# Patient Record
Sex: FEMALE | Race: ASIAN | NOT HISPANIC OR LATINO
[De-identification: names, ages, dates, MRNs, and addresses within clinical notes are randomized per-mention and may not be internally consistent; named-entity substitution may affect disease eponyms.]

---

## 2018-05-31 ENCOUNTER — APPOINTMENT (OUTPATIENT)
Dept: NEUROLOGY | Facility: CLINIC | Age: 53
End: 2018-05-31

## 2018-08-24 ENCOUNTER — INPATIENT (INPATIENT)
Facility: HOSPITAL | Age: 53
LOS: 1 days | Discharge: HOME | DRG: 441 | End: 2018-08-26
Attending: GENERAL ACUTE CARE HOSPITAL | Admitting: HOSPITALIST
Payer: COMMERCIAL

## 2018-08-24 VITALS — HEIGHT: 60 IN | WEIGHT: 123.9 LBS

## 2018-08-24 DIAGNOSIS — E11.65 TYPE 2 DIABETES MELLITUS WITH HYPERGLYCEMIA: ICD-10-CM

## 2018-08-24 DIAGNOSIS — K72.90 HEPATIC FAILURE, UNSPECIFIED WITHOUT COMA: ICD-10-CM

## 2018-08-24 DIAGNOSIS — Z29.9 ENCOUNTER FOR PROPHYLACTIC MEASURES, UNSPECIFIED: ICD-10-CM

## 2018-08-24 DIAGNOSIS — B18.2 CHRONIC VIRAL HEPATITIS C: ICD-10-CM

## 2018-08-24 DIAGNOSIS — Z98.891 HISTORY OF UTERINE SCAR FROM PREVIOUS SURGERY: Chronic | ICD-10-CM

## 2018-08-24 DIAGNOSIS — D69.6 THROMBOCYTOPENIA, UNSPECIFIED: ICD-10-CM

## 2018-08-24 DIAGNOSIS — G50.0 TRIGEMINAL NEURALGIA: ICD-10-CM

## 2018-08-24 LAB
ALBUMIN SERPL ELPH-MCNC: 3.1 G/DL — LOW (ref 3.3–5.2)
ALP SERPL-CCNC: 131 U/L — HIGH (ref 40–120)
ALT FLD-CCNC: 15 U/L — SIGNIFICANT CHANGE UP
AMMONIA BLD-MCNC: 97 UMOL/L — HIGH (ref 11–55)
ANION GAP SERPL CALC-SCNC: 13 MMOL/L — SIGNIFICANT CHANGE UP (ref 5–17)
APTT BLD: 35.2 SEC — SIGNIFICANT CHANGE UP (ref 27.5–37.4)
AST SERPL-CCNC: 28 U/L — SIGNIFICANT CHANGE UP
BASOPHILS # BLD AUTO: 0 K/UL — SIGNIFICANT CHANGE UP (ref 0–0.2)
BASOPHILS NFR BLD AUTO: 0.5 % — SIGNIFICANT CHANGE UP (ref 0–2)
BILIRUB SERPL-MCNC: 2.7 MG/DL — HIGH (ref 0.4–2)
BUN SERPL-MCNC: 9 MG/DL — SIGNIFICANT CHANGE UP (ref 8–20)
CALCIUM SERPL-MCNC: 8.8 MG/DL — SIGNIFICANT CHANGE UP (ref 8.6–10.2)
CHLORIDE SERPL-SCNC: 110 MMOL/L — HIGH (ref 98–107)
CO2 SERPL-SCNC: 19 MMOL/L — LOW (ref 22–29)
CREAT SERPL-MCNC: 0.5 MG/DL — SIGNIFICANT CHANGE UP (ref 0.5–1.3)
EOSINOPHIL # BLD AUTO: 0.1 K/UL — SIGNIFICANT CHANGE UP (ref 0–0.5)
EOSINOPHIL NFR BLD AUTO: 1.8 % — SIGNIFICANT CHANGE UP (ref 0–6)
GLUCOSE SERPL-MCNC: 175 MG/DL — HIGH (ref 70–115)
HCT VFR BLD CALC: 40.7 % — SIGNIFICANT CHANGE UP (ref 37–47)
HGB BLD-MCNC: 13.6 G/DL — SIGNIFICANT CHANGE UP (ref 12–16)
INR BLD: 1.2 RATIO — HIGH (ref 0.88–1.16)
LYMPHOCYTES # BLD AUTO: 1.5 K/UL — SIGNIFICANT CHANGE UP (ref 1–4.8)
LYMPHOCYTES # BLD AUTO: 34.1 % — SIGNIFICANT CHANGE UP (ref 20–55)
MCHC RBC-ENTMCNC: 25.8 PG — LOW (ref 27–31)
MCHC RBC-ENTMCNC: 33.4 G/DL — SIGNIFICANT CHANGE UP (ref 32–36)
MCV RBC AUTO: 77.1 FL — LOW (ref 81–99)
MONOCYTES # BLD AUTO: 0.4 K/UL — SIGNIFICANT CHANGE UP (ref 0–0.8)
MONOCYTES NFR BLD AUTO: 10.1 % — HIGH (ref 3–10)
NEUTROPHILS # BLD AUTO: 2.3 K/UL — SIGNIFICANT CHANGE UP (ref 1.8–8)
NEUTROPHILS NFR BLD AUTO: 53.3 % — SIGNIFICANT CHANGE UP (ref 37–73)
PLATELET # BLD AUTO: 107 K/UL — LOW (ref 150–400)
POTASSIUM SERPL-MCNC: 3.8 MMOL/L — SIGNIFICANT CHANGE UP (ref 3.5–5.3)
POTASSIUM SERPL-SCNC: 3.8 MMOL/L — SIGNIFICANT CHANGE UP (ref 3.5–5.3)
PROT SERPL-MCNC: 7.6 G/DL — SIGNIFICANT CHANGE UP (ref 6.6–8.7)
PROTHROM AB SERPL-ACNC: 13.2 SEC — HIGH (ref 9.8–12.7)
RBC # BLD: 5.28 M/UL — HIGH (ref 4.4–5.2)
RBC # FLD: 15.9 % — HIGH (ref 11–15.6)
SODIUM SERPL-SCNC: 142 MMOL/L — SIGNIFICANT CHANGE UP (ref 135–145)
TROPONIN T SERPL-MCNC: <0.01 NG/ML — SIGNIFICANT CHANGE UP (ref 0–0.06)
WBC # BLD: 4.3 K/UL — LOW (ref 4.8–10.8)
WBC # FLD AUTO: 4.3 K/UL — LOW (ref 4.8–10.8)

## 2018-08-24 PROCEDURE — 70450 CT HEAD/BRAIN W/O DYE: CPT | Mod: 26

## 2018-08-24 PROCEDURE — 99223 1ST HOSP IP/OBS HIGH 75: CPT

## 2018-08-24 PROCEDURE — 71045 X-RAY EXAM CHEST 1 VIEW: CPT | Mod: 26

## 2018-08-24 PROCEDURE — 99285 EMERGENCY DEPT VISIT HI MDM: CPT

## 2018-08-24 RX ORDER — PANTOPRAZOLE SODIUM 20 MG/1
40 TABLET, DELAYED RELEASE ORAL
Qty: 0 | Refills: 0 | Status: DISCONTINUED | OUTPATIENT
Start: 2018-08-24 | End: 2018-08-26

## 2018-08-24 RX ORDER — OMEPRAZOLE 10 MG/1
1 CAPSULE, DELAYED RELEASE ORAL
Qty: 0 | Refills: 0 | COMMUNITY

## 2018-08-24 RX ORDER — LACTULOSE 10 G/15ML
20 SOLUTION ORAL ONCE
Qty: 0 | Refills: 0 | Status: COMPLETED | OUTPATIENT
Start: 2018-08-24 | End: 2018-08-24

## 2018-08-24 RX ORDER — GLUCAGON INJECTION, SOLUTION 0.5 MG/.1ML
1 INJECTION, SOLUTION SUBCUTANEOUS ONCE
Qty: 0 | Refills: 0 | Status: DISCONTINUED | OUTPATIENT
Start: 2018-08-24 | End: 2018-08-26

## 2018-08-24 RX ORDER — INSULIN DETEMIR 100/ML (3)
20 INSULIN PEN (ML) SUBCUTANEOUS
Qty: 0 | Refills: 0 | COMMUNITY

## 2018-08-24 RX ORDER — GLECAPREVIR AND PIBRENTASVIR 40; 100 MG/1; MG/1
3 TABLET, FILM COATED ORAL
Qty: 0 | Refills: 0 | COMMUNITY

## 2018-08-24 RX ORDER — LACTULOSE 10 G/15ML
15 SOLUTION ORAL THREE TIMES A DAY
Qty: 0 | Refills: 0 | Status: DISCONTINUED | OUTPATIENT
Start: 2018-08-24 | End: 2018-08-26

## 2018-08-24 RX ORDER — INSULIN LISPRO 100/ML
VIAL (ML) SUBCUTANEOUS
Qty: 0 | Refills: 0 | Status: DISCONTINUED | OUTPATIENT
Start: 2018-08-24 | End: 2018-08-26

## 2018-08-24 RX ORDER — SODIUM CHLORIDE 9 MG/ML
1000 INJECTION, SOLUTION INTRAVENOUS
Qty: 0 | Refills: 0 | Status: DISCONTINUED | OUTPATIENT
Start: 2018-08-24 | End: 2018-08-26

## 2018-08-24 RX ORDER — SODIUM CHLORIDE 9 MG/ML
3 INJECTION INTRAMUSCULAR; INTRAVENOUS; SUBCUTANEOUS ONCE
Qty: 0 | Refills: 0 | Status: COMPLETED | OUTPATIENT
Start: 2018-08-24 | End: 2018-08-24

## 2018-08-24 RX ORDER — HEPARIN SODIUM 5000 [USP'U]/ML
5000 INJECTION INTRAVENOUS; SUBCUTANEOUS EVERY 8 HOURS
Qty: 0 | Refills: 0 | Status: DISCONTINUED | OUTPATIENT
Start: 2018-08-24 | End: 2018-08-25

## 2018-08-24 RX ORDER — INSULIN DETEMIR 100/ML (3)
10 INSULIN PEN (ML) SUBCUTANEOUS
Qty: 0 | Refills: 0 | Status: DISCONTINUED | OUTPATIENT
Start: 2018-08-24 | End: 2018-08-26

## 2018-08-24 RX ORDER — SODIUM CHLORIDE 9 MG/ML
1000 INJECTION INTRAMUSCULAR; INTRAVENOUS; SUBCUTANEOUS ONCE
Qty: 0 | Refills: 0 | Status: COMPLETED | OUTPATIENT
Start: 2018-08-24 | End: 2018-08-24

## 2018-08-24 RX ORDER — DEXTROSE 50 % IN WATER 50 %
12.5 SYRINGE (ML) INTRAVENOUS ONCE
Qty: 0 | Refills: 0 | Status: DISCONTINUED | OUTPATIENT
Start: 2018-08-24 | End: 2018-08-26

## 2018-08-24 RX ORDER — INSULIN DETEMIR 100/ML (3)
20 INSULIN PEN (ML) SUBCUTANEOUS
Qty: 0 | Refills: 0 | Status: DISCONTINUED | OUTPATIENT
Start: 2018-08-24 | End: 2018-08-26

## 2018-08-24 RX ORDER — DEXTROSE 50 % IN WATER 50 %
15 SYRINGE (ML) INTRAVENOUS ONCE
Qty: 0 | Refills: 0 | Status: DISCONTINUED | OUTPATIENT
Start: 2018-08-24 | End: 2018-08-26

## 2018-08-24 RX ORDER — INSULIN DETEMIR 100/ML (3)
10 INSULIN PEN (ML) SUBCUTANEOUS
Qty: 0 | Refills: 0 | COMMUNITY

## 2018-08-24 RX ADMIN — LACTULOSE 20 GRAM(S): 10 SOLUTION ORAL at 23:13

## 2018-08-24 RX ADMIN — SODIUM CHLORIDE 1000 MILLILITER(S): 9 INJECTION INTRAMUSCULAR; INTRAVENOUS; SUBCUTANEOUS at 19:59

## 2018-08-24 RX ADMIN — SODIUM CHLORIDE 1000 MILLILITER(S): 9 INJECTION INTRAMUSCULAR; INTRAVENOUS; SUBCUTANEOUS at 22:31

## 2018-08-24 RX ADMIN — SODIUM CHLORIDE 3 MILLILITER(S): 9 INJECTION INTRAMUSCULAR; INTRAVENOUS; SUBCUTANEOUS at 19:59

## 2018-08-24 NOTE — H&P ADULT - HISTORY OF PRESENT ILLNESS
Pt is 51 yo F presenting with tremors (Asterixis), PMH Hep C on (Mavyret for approx 3/8 weeks), Trigeminal neuralgia, DM2 on insulin.   Patient states that for the past 2 days her "tremor" has been getting worse. She has noted a hand tremor for approx 4 weeks but now it has gotten worse. Family also reports that patient was confused yesterday and could not remember things she did throughout the day. Patient is being treated for Hep C (does not know how it was transmitted to her) by GI (Dr. Oli Reddy).   Patient denies headaches (aside from occasional R sided pain related to Trigeminal Neuralgia), nausea, vomiting, chest pain, palpitations, SOB, abd pain, diarrhea, constipation, urinary complaints.   She does not take lactulose. Insulin dose confirmed with patient  In ED CT head negative, Ammonia level high at 97, Plt 108. EKG NSR.

## 2018-08-24 NOTE — H&P ADULT - PROBLEM SELECTOR PLAN 2
Patient's family to bring in her Mavyret tomorrow and can continue her home med and can be administered by pharmacy

## 2018-08-24 NOTE — ED STATDOCS - OBJECTIVE STATEMENT
52 y.o F with PMHx DM, Hepatitis B, presents to ED c/o generalized shakes, and weakness starting yesterday and growing worse today. Pt's family noted that she was slow to respond to questions but evidently conscious at home. As per patient's daughter she has had difficulty lifting objects secondary to weakness. Pt's blood sugar was in the 200s at home this morning. Denies fever, chills, nausea, vomiting, abdominal pain, dysuria

## 2018-08-24 NOTE — ED PROVIDER NOTE - OBJECTIVE STATEMENT
52 year old female with PMh HTN and hep C presents with confusion and lethargy. Family states that for the past 2-3 days she has been confused, lethargic, and tremulous. Sx are constant and diffuse. Denies fever, chills, abd pain, vomiting, diarrhea, chest pain, SOB.

## 2018-08-24 NOTE — ED ADULT NURSE NOTE - NSIMPLEMENTINTERV_GEN_ALL_ED
Implemented All Universal Safety Interventions:  [place] to call system. Call bell, personal items and telephone within reach. Instruct patient to call for assistance. Room bathroom lighting operational. Non-slip footwear when patient is off stretcher. Physically safe environment: no spills, clutter or unnecessary equipment. Stretcher in lowest position, wheels locked, appropriate side rails in place.

## 2018-08-24 NOTE — H&P ADULT - NEUROLOGICAL DETAILS
alert and oriented x 3/normal strength/responds to verbal commands/sensation intact/cranial nerves intact

## 2018-08-24 NOTE — H&P ADULT - ASSESSMENT
Pt is 51 yo F presenting with tremors (Asterixis), PMH Hep C on (Mavyret for approx 3/8 weeks), Trigeminal neuralgia, DM2 on insulin.

## 2018-08-24 NOTE — H&P ADULT - RS GEN PE MLT RESP DETAILS PC
respirations non-labored/no rales/no rhonchi/no wheezes/breath sounds equal/airway patent/good air movement/clear to auscultation bilaterally

## 2018-08-24 NOTE — ED STATDOCS - PROGRESS NOTE DETAILS
Exam: generalized tremors, able to move all extremities, no facial droop. Will need to go to Main ED. I will monitor for AMS vs hepatic encephalopathy. 52 y.o F with PMHx DM, Hepatitis B, presents to ED c/o generalized shakes, and weakness starting yesterday and growing worse today. Pt's family noted that she was slow to respond to questions but evidently conscious at home. As per patient's daughter she has had difficulty lifting objects secondary to weakness. Pt's blood sugar was in the 200s at home this morning. Denies fever, chills, nausea, vomiting, abdominal pain, dysuria  Exam: generalized tremors, able to move all extremities, no facial droop. Will need to go to Main ED. I will monitor for AMS vs hepatic encephalopathy.

## 2018-08-24 NOTE — H&P ADULT - NSHPLANGTRANSLATORFT_GEN_A_CORE
Patient speaks C[place] and Thai. Patient did not want translation service, wanted daughter to translate. Translater was offered.

## 2018-08-24 NOTE — ED ADULT NURSE NOTE - OBJECTIVE STATEMENT
received in room c/o feeling weak since yesterday neuro intact, per daughter recently dx with hepatitis started on new meds

## 2018-08-24 NOTE — H&P ADULT - PROBLEM SELECTOR PLAN 1
Currently A and O x3,   Encephalopathy likely 2/2 to her hyperammonemia related to Hep C.   -Start Lactulose titrated to 2-3 loose BM  -consult GI

## 2018-08-25 LAB
ACETONE SERPL-MCNC: NEGATIVE — SIGNIFICANT CHANGE UP
ALBUMIN SERPL ELPH-MCNC: 2.8 G/DL — LOW (ref 3.3–5.2)
ALP SERPL-CCNC: 115 U/L — SIGNIFICANT CHANGE UP (ref 40–120)
ALT FLD-CCNC: 15 U/L — SIGNIFICANT CHANGE UP
AMPHET UR-MCNC: NEGATIVE — SIGNIFICANT CHANGE UP
ANION GAP SERPL CALC-SCNC: 15 MMOL/L — SIGNIFICANT CHANGE UP (ref 5–17)
APPEARANCE UR: CLEAR — SIGNIFICANT CHANGE UP
AST SERPL-CCNC: 27 U/L — SIGNIFICANT CHANGE UP
BACTERIA # UR AUTO: ABNORMAL
BARBITURATES UR SCN-MCNC: NEGATIVE — SIGNIFICANT CHANGE UP
BENZODIAZ UR-MCNC: NEGATIVE — SIGNIFICANT CHANGE UP
BILIRUB SERPL-MCNC: 2.5 MG/DL — HIGH (ref 0.4–2)
BILIRUB UR-MCNC: NEGATIVE — SIGNIFICANT CHANGE UP
BUN SERPL-MCNC: 8 MG/DL — SIGNIFICANT CHANGE UP (ref 8–20)
CALCIUM SERPL-MCNC: 8.5 MG/DL — LOW (ref 8.6–10.2)
CHLORIDE SERPL-SCNC: 109 MMOL/L — HIGH (ref 98–107)
CO2 SERPL-SCNC: 16 MMOL/L — LOW (ref 22–29)
COCAINE METAB.OTHER UR-MCNC: NEGATIVE — SIGNIFICANT CHANGE UP
COLOR SPEC: YELLOW — SIGNIFICANT CHANGE UP
COMMENT - URINE: SIGNIFICANT CHANGE UP
CREAT SERPL-MCNC: 0.46 MG/DL — LOW (ref 0.5–1.3)
DIFF PNL FLD: NEGATIVE — SIGNIFICANT CHANGE UP
EPI CELLS # UR: SIGNIFICANT CHANGE UP
GLUCOSE BLDC GLUCOMTR-MCNC: 113 MG/DL — HIGH (ref 70–99)
GLUCOSE BLDC GLUCOMTR-MCNC: 115 MG/DL — HIGH (ref 70–99)
GLUCOSE BLDC GLUCOMTR-MCNC: 147 MG/DL — HIGH (ref 70–99)
GLUCOSE BLDC GLUCOMTR-MCNC: 284 MG/DL — HIGH (ref 70–99)
GLUCOSE BLDC GLUCOMTR-MCNC: 299 MG/DL — HIGH (ref 70–99)
GLUCOSE SERPL-MCNC: 151 MG/DL — HIGH (ref 70–115)
GLUCOSE UR QL: NEGATIVE MG/DL — SIGNIFICANT CHANGE UP
HBA1C BLD-MCNC: 7.6 % — HIGH (ref 4–5.6)
HCT VFR BLD CALC: 37.3 % — SIGNIFICANT CHANGE UP (ref 37–47)
HGB BLD-MCNC: 12.1 G/DL — SIGNIFICANT CHANGE UP (ref 12–16)
KETONES UR-MCNC: NEGATIVE — SIGNIFICANT CHANGE UP
LACTATE SERPL-SCNC: 1.6 MMOL/L — SIGNIFICANT CHANGE UP (ref 0.5–2)
LEUKOCYTE ESTERASE UR-ACNC: ABNORMAL
MCHC RBC-ENTMCNC: 25.3 PG — LOW (ref 27–31)
MCHC RBC-ENTMCNC: 32.4 G/DL — SIGNIFICANT CHANGE UP (ref 32–36)
MCV RBC AUTO: 77.9 FL — LOW (ref 81–99)
METHADONE UR-MCNC: NEGATIVE — SIGNIFICANT CHANGE UP
NITRITE UR-MCNC: NEGATIVE — SIGNIFICANT CHANGE UP
OPIATES UR-MCNC: NEGATIVE — SIGNIFICANT CHANGE UP
PCP SPEC-MCNC: SIGNIFICANT CHANGE UP
PCP UR-MCNC: NEGATIVE — SIGNIFICANT CHANGE UP
PH UR: 8 — SIGNIFICANT CHANGE UP (ref 5–8)
PLATELET # BLD AUTO: 115 K/UL — LOW (ref 150–400)
POTASSIUM SERPL-MCNC: 3.4 MMOL/L — LOW (ref 3.5–5.3)
POTASSIUM SERPL-SCNC: 3.4 MMOL/L — LOW (ref 3.5–5.3)
PROT SERPL-MCNC: 7 G/DL — SIGNIFICANT CHANGE UP (ref 6.6–8.7)
PROT UR-MCNC: NEGATIVE MG/DL — SIGNIFICANT CHANGE UP
RBC # BLD: 4.79 M/UL — SIGNIFICANT CHANGE UP (ref 4.4–5.2)
RBC # FLD: 16.1 % — HIGH (ref 11–15.6)
RBC CASTS # UR COMP ASSIST: NEGATIVE /HPF — SIGNIFICANT CHANGE UP (ref 0–4)
SODIUM SERPL-SCNC: 140 MMOL/L — SIGNIFICANT CHANGE UP (ref 135–145)
SP GR SPEC: 1.01 — SIGNIFICANT CHANGE UP (ref 1.01–1.02)
THC UR QL: NEGATIVE — SIGNIFICANT CHANGE UP
UROBILINOGEN FLD QL: NEGATIVE MG/DL — SIGNIFICANT CHANGE UP
WBC # BLD: 4 K/UL — LOW (ref 4.8–10.8)
WBC # FLD AUTO: 4 K/UL — LOW (ref 4.8–10.8)
WBC UR QL: SIGNIFICANT CHANGE UP

## 2018-08-25 PROCEDURE — 99222 1ST HOSP IP/OBS MODERATE 55: CPT

## 2018-08-25 PROCEDURE — 99233 SBSQ HOSP IP/OBS HIGH 50: CPT

## 2018-08-25 PROCEDURE — 76700 US EXAM ABDOM COMPLETE: CPT | Mod: 26

## 2018-08-25 RX ORDER — SODIUM CHLORIDE 9 MG/ML
1000 INJECTION, SOLUTION INTRAVENOUS
Qty: 0 | Refills: 0 | Status: COMPLETED | OUTPATIENT
Start: 2018-08-25 | End: 2018-08-25

## 2018-08-25 RX ORDER — CARVEDILOL PHOSPHATE 80 MG/1
3.12 CAPSULE, EXTENDED RELEASE ORAL EVERY 12 HOURS
Qty: 0 | Refills: 0 | Status: DISCONTINUED | OUTPATIENT
Start: 2018-08-25 | End: 2018-08-26

## 2018-08-25 RX ORDER — HYDRALAZINE HCL 50 MG
10 TABLET ORAL EVERY 8 HOURS
Qty: 0 | Refills: 0 | Status: DISCONTINUED | OUTPATIENT
Start: 2018-08-25 | End: 2018-08-26

## 2018-08-25 RX ORDER — POTASSIUM CHLORIDE 20 MEQ
40 PACKET (EA) ORAL ONCE
Qty: 0 | Refills: 0 | Status: COMPLETED | OUTPATIENT
Start: 2018-08-25 | End: 2018-08-25

## 2018-08-25 RX ADMIN — LACTULOSE 15 GRAM(S): 10 SOLUTION ORAL at 21:07

## 2018-08-25 RX ADMIN — HEPARIN SODIUM 5000 UNIT(S): 5000 INJECTION INTRAVENOUS; SUBCUTANEOUS at 05:40

## 2018-08-25 RX ADMIN — Medication 20 UNIT(S): at 11:57

## 2018-08-25 RX ADMIN — LACTULOSE 15 GRAM(S): 10 SOLUTION ORAL at 05:39

## 2018-08-25 RX ADMIN — Medication 10 MILLIGRAM(S): at 23:53

## 2018-08-25 RX ADMIN — Medication 40 MILLIEQUIVALENT(S): at 16:16

## 2018-08-25 RX ADMIN — LACTULOSE 15 GRAM(S): 10 SOLUTION ORAL at 16:15

## 2018-08-25 RX ADMIN — SODIUM CHLORIDE 75 MILLILITER(S): 9 INJECTION, SOLUTION INTRAVENOUS at 16:15

## 2018-08-25 RX ADMIN — PANTOPRAZOLE SODIUM 40 MILLIGRAM(S): 20 TABLET, DELAYED RELEASE ORAL at 05:40

## 2018-08-25 RX ADMIN — CARVEDILOL PHOSPHATE 3.12 MILLIGRAM(S): 80 CAPSULE, EXTENDED RELEASE ORAL at 17:20

## 2018-08-25 RX ADMIN — Medication 3: at 12:52

## 2018-08-25 NOTE — ED ADULT NURSE REASSESSMENT NOTE - NS ED NURSE REASSESS COMMENT FT1
Received report from RN CS. Patient received awake alert and oriented x 3. prefers Welsh. Patient respirations even and unlabored. denies pain. moves all extremities. awaiting admit orders.

## 2018-08-25 NOTE — ED ADULT NURSE REASSESSMENT NOTE - NS ED NURSE REASSESS COMMENT FT1
pt a&ox3 denies any pain/discomfort. vss. urine sent. family at bedside, updated on plan of care, verbalizes understanding. call bell in reach pt a&ox3 denies any pain/discomfort. vss. urine sent. still reports no bm s/p lactulose. family at bedside, updated on plan of care, verbalizes understanding. call bell in reach

## 2018-08-25 NOTE — PROGRESS NOTE ADULT - ASSESSMENT
Patient is 51 yo female with PMH Hep C on (Mavyret for approx 3/8 weeks), Trigeminal neuralgia, DM2 on insulin who was admitted with hepatic encephalopathy and asterixis.     Problem/Plan - 1:  ·  Problem: Hepatic encephalopathy.  Plan: Mentation improved per family  -currently AAO x 3  -Continue lactulose and add rifaximin   -repeat ammonia level in AM  -GI consult appreciated     Problem/Plan - 2:  ·  Problem: Chronic hepatitis C without hepatic coma.  Plan: continue Mavyret daily (non-formulary added)  -Abdomen sonogram ordered  -Repeat labs and ammonia level in AM  -GI consult appreciated     Problem/Plan - 3:  ·  Problem: Type 2 diabetes mellitus with hyperglycemia, with long-term current use of insulin.  Plan: Continue Levemir 20U morning and 10 units at night   -ISS  -maintain glycemic control     Problem/Plan - 4:  ·  Problem: Trigeminal neuralgia of right side of face.  Plan: asymptomatic  -supportive care     Problem/Plan - 5:  ·  Problem: Thrombocytopenia.  Plan: likely secondary to liver disease   -no overt signs of bleeding  -monitor levels closely     Problem/Plan - 6:  Problem: DVT prophylaxis . Plan: SCDs Patient is 51 yo female with PMH Hep C on (Mavyret for approx 3/8 weeks), Trigeminal neuralgia, DM2 on insulin who was admitted with hepatic encephalopathy and asterixis.     Problem/Plan - 1:  ·  Problem: Hepatic encephalopathy.  Plan: Mentation improved per family  -currently AAO x 3  -Continue lactulose and add rifaximin   -repeat ammonia level in AM  -GI consult appreciated     Problem/Plan - 2:  ·  Problem: Chronic hepatitis C without hepatic coma.  Plan: continue Mavyret daily (non-formulary added)  -Abdomen sonogram ordered  -Repeat labs and ammonia level in AM  -GI consult appreciated     Problem/Plan - 3:  ·  Problem: Type 2 diabetes mellitus with hyperglycemia, with long-term current use of insulin.  Plan: Continue Levemir 20U morning and 10 units at night   -ISS  -maintain glycemic control     Problem/Plan - 4:  ·  Problem: Trigeminal neuralgia of right side of face.  Plan: asymptomatic  -supportive care     Problem/Plan - 5:  ·  Problem: Thrombocytopenia.  Plan: likely secondary to liver disease   -no overt signs of bleeding  -monitor levels closely     Problem/Plan - 6:  ·  Problem: Hypokalemia.  Plan: likely due to GI losses  -Replete     Problem/Plan - 7:  ·  Problem: Anion gap metabolic acidosis. Plan: likely secondary to starvation ketoacidosis  -check lactate  -check acetone level  -isotonic bicarb gtt     Problem/Plan - 8:  Problem: DVT prophylaxis . Plan: SCDs

## 2018-08-25 NOTE — PROGRESS NOTE ADULT - SUBJECTIVE AND OBJECTIVE BOX
CHIEF COMPLAINT/INTERVAL HISTORY:    Patient is a 52y old  Female who presents with a chief complaint of Hyperammonemia/Hepatic Enceph (24 Aug 2018 23:03)    SUBJECTIVE & OBJECTIVE: Pt seen and examined at bedside. Admitted overnight with hepatic encephalopathy; 1 BM with lactulose overnight. Denies any active complaints this morning. Family reports patient has significantly improved since yesterday; rifaximin added by GI. Abdomen sono pending.    ROS: No chest pain, palpitations, SOB, light headedness, dizziness, headache, nausea/vomiting, fevers/chills, abdominal pain, dysuria or increased urinary frequency.    ICU Vital Signs Last 24 Hrs  T(C): 36.1 (25 Aug 2018 11:40), Max: 37.2 (24 Aug 2018 16:37)  T(F): 97 (25 Aug 2018 11:40), Max: 98.9 (24 Aug 2018 16:37)  HR: 77 (25 Aug 2018 11:40) (77 - 87)  BP: 160/89 (25 Aug 2018 11:40) (125/57 - 165/77)  BP(mean): 165 (25 Aug 2018 04:07) (165 - 165)  RR: 18 (25 Aug 2018 11:40) (14 - 20)  SpO2: 99% (25 Aug 2018 11:40) (97% - 99%)    MEDICATIONS  (STANDING):  dextrose 5%. 1000 milliLiter(s) (50 mL/Hr) IV Continuous <Continuous>  dextrose 50% Injectable 12.5 Gram(s) IV Push once  heparin  Injectable 5000 Unit(s) SubCutaneous every 8 hours  insulin detemir injectable (LEVEMIR) 20 Unit(s) SubCutaneous before breakfast  insulin detemir injectable (LEVEMIR) 10 Unit(s) SubCutaneous with dinner  insulin lispro (HumaLOG) corrective regimen sliding scale   SubCutaneous three times a day before meals  lactulose Syrup 15 Gram(s) Oral three times a day  Mavyret tablet 3 Tablet(s) 3 Tablet(s) Oral daily  pantoprazole    Tablet 40 milliGRAM(s) Oral before breakfast  rifaximin 550 milliGRAM(s) Oral two times a day    MEDICATIONS  (PRN):  dextrose 40% Gel 15 Gram(s) Oral once PRN Blood Glucose LESS THAN 70 milliGRAM(s)/deciliter  glucagon  Injectable 1 milliGRAM(s) IntraMuscular once PRN Glucose LESS THAN 70 milligrams/deciliter      LABS:                        13.6   4.3   )-----------( 107      ( 24 Aug 2018 20:23 )             40.7         142  |  110<H>  |  9.0  ----------------------------<  175<H>  3.8   |  19.0<L>  |  0.50    Ca    8.8      24 Aug 2018 20:23    TPro  7.6  /  Alb  3.1<L>  /  TBili  2.7<H>  /  DBili  x   /  AST  28  /  ALT  15  /  AlkPhos  131<H>      PT/INR - ( 24 Aug 2018 20:23 )   PT: 13.2 sec;   INR: 1.20 ratio         PTT - ( 24 Aug 2018 20:23 )  PTT:35.2 sec  Urinalysis Basic - ( 25 Aug 2018 00:25 )    Color: Yellow / Appearance: Clear / S.015 / pH: x  Gluc: x / Ketone: Negative  / Bili: Negative / Urobili: Negative mg/dL   Blood: x / Protein: Negative mg/dL / Nitrite: Negative   Leuk Esterase: Moderate / RBC: Negative /HPF / WBC 3-5   Sq Epi: x / Non Sq Epi: Few / Bacteria: Few        CAPILLARY BLOOD GLUCOSE      POCT Blood Glucose.: 147 mg/dL (25 Aug 2018 04:17)  POCT Blood Glucose.: 115 mg/dL (25 Aug 2018 00:24)  POCT Blood Glucose.: 233 mg/dL (24 Aug 2018 16:44)      PHYSICAL EXAM:    GENERAL: middle aged female, sitting in bed, NAD  HEAD:  Atraumatic, Normocephalic  EYES: EOMI, PERRLA, conjunctiva and sclera clear  ENMT: Moist mucous membranes  NECK: Supple, No JVD  NERVOUS SYSTEM:  Alert & Oriented X3, Motor Strength 5/5 B/L upper and lower extremities; DTRs 2+ intact and symmetric  CHEST/LUNG: Clear to auscultation bilaterally   HEART: Regular rate and rhythm; + S1/S2  ABDOMEN: Soft, Nontender, Nondistended; Bowel sounds present  EXTREMITIES:  no pedal edema

## 2018-08-25 NOTE — CONSULT NOTE ADULT - ASSESSMENT
Chronic HCV infection, unknown source or duration.  Low platelet count and mild coagulopathy suggestive of Cirrhosis.  Also has hx of IDDM.   Acute change in mental status x 2 days.  Now much better with just a few doses of lactulose.  No overt bleeding by hx.  Pt refused ADDISON.  Not toxic.  No apparent ascites.    Plan Add Xifaxam.  Sonogram of liver, abd requested. Check AFP.  Repeat BW in AM including ammonia level.  Regular diet. Continue modest doses of Lactulose.

## 2018-08-25 NOTE — ED ADULT NURSE REASSESSMENT NOTE - NS ED NURSE REASSESS COMMENT FT1
awake alert and oriented x3 resp even and unlabored. denies any complaints. mvoes all extremities. awaiting bed assignment. updated regarding plan of care. verbalized understanding.

## 2018-08-25 NOTE — ED ADULT NURSE REASSESSMENT NOTE - NS ED NURSE REASSESS COMMENT FT1
dr broussard aware of bp; okay to go to [facility]. Also should receive 1L of fluids as ordered. In progress at time of transport.

## 2018-08-25 NOTE — PATIENT PROFILE ADULT. - NS PRO CONTRA FLU 1
I reviewed the H&P, I examined the patient, and there are no changes in the patient's condition.    
out of season (available sept 1 thru apr 2 only)

## 2018-08-26 ENCOUNTER — TRANSCRIPTION ENCOUNTER (OUTPATIENT)
Age: 53
End: 2018-08-26

## 2018-08-26 VITALS
RESPIRATION RATE: 18 BRPM | HEART RATE: 65 BPM | TEMPERATURE: 98 F | DIASTOLIC BLOOD PRESSURE: 80 MMHG | SYSTOLIC BLOOD PRESSURE: 136 MMHG | OXYGEN SATURATION: 98 %

## 2018-08-26 LAB
AFP-TM SERPL-MCNC: 5.2 NG/ML — SIGNIFICANT CHANGE UP
ALBUMIN SERPL ELPH-MCNC: 2.8 G/DL — LOW (ref 3.3–5.2)
ALP SERPL-CCNC: 124 U/L — HIGH (ref 40–120)
ALT FLD-CCNC: 14 U/L — SIGNIFICANT CHANGE UP
AMMONIA BLD-MCNC: 116 UMOL/L — HIGH (ref 11–55)
ANION GAP SERPL CALC-SCNC: 12 MMOL/L — SIGNIFICANT CHANGE UP (ref 5–17)
AST SERPL-CCNC: 25 U/L — SIGNIFICANT CHANGE UP
BILIRUB SERPL-MCNC: 2.6 MG/DL — HIGH (ref 0.4–2)
BUN SERPL-MCNC: 8 MG/DL — SIGNIFICANT CHANGE UP (ref 8–20)
CALCIUM SERPL-MCNC: 8.9 MG/DL — SIGNIFICANT CHANGE UP (ref 8.6–10.2)
CHLORIDE SERPL-SCNC: 105 MMOL/L — SIGNIFICANT CHANGE UP (ref 98–107)
CO2 SERPL-SCNC: 21 MMOL/L — LOW (ref 22–29)
CREAT SERPL-MCNC: 0.54 MG/DL — SIGNIFICANT CHANGE UP (ref 0.5–1.3)
CULTURE RESULTS: SIGNIFICANT CHANGE UP
GLUCOSE BLDC GLUCOMTR-MCNC: 151 MG/DL — HIGH (ref 70–99)
GLUCOSE BLDC GLUCOMTR-MCNC: 242 MG/DL — HIGH (ref 70–99)
GLUCOSE SERPL-MCNC: 184 MG/DL — HIGH (ref 70–115)
HCT VFR BLD CALC: 39 % — SIGNIFICANT CHANGE UP (ref 37–47)
HGB BLD-MCNC: 12.9 G/DL — SIGNIFICANT CHANGE UP (ref 12–16)
MCHC RBC-ENTMCNC: 25.4 PG — LOW (ref 27–31)
MCHC RBC-ENTMCNC: 33.1 G/DL — SIGNIFICANT CHANGE UP (ref 32–36)
MCV RBC AUTO: 76.9 FL — LOW (ref 81–99)
PLATELET # BLD AUTO: 126 K/UL — LOW (ref 150–400)
POTASSIUM SERPL-MCNC: 3.6 MMOL/L — SIGNIFICANT CHANGE UP (ref 3.5–5.3)
POTASSIUM SERPL-SCNC: 3.6 MMOL/L — SIGNIFICANT CHANGE UP (ref 3.5–5.3)
PROT SERPL-MCNC: 7.3 G/DL — SIGNIFICANT CHANGE UP (ref 6.6–8.7)
RBC # BLD: 5.07 M/UL — SIGNIFICANT CHANGE UP (ref 4.4–5.2)
RBC # FLD: 16.1 % — HIGH (ref 11–15.6)
SODIUM SERPL-SCNC: 138 MMOL/L — SIGNIFICANT CHANGE UP (ref 135–145)
SPECIMEN SOURCE: SIGNIFICANT CHANGE UP
WBC # BLD: 4.6 K/UL — LOW (ref 4.8–10.8)
WBC # FLD AUTO: 4.6 K/UL — LOW (ref 4.8–10.8)

## 2018-08-26 PROCEDURE — 80053 COMPREHEN METABOLIC PANEL: CPT

## 2018-08-26 PROCEDURE — 81001 URINALYSIS AUTO W/SCOPE: CPT

## 2018-08-26 PROCEDURE — 84484 ASSAY OF TROPONIN QUANT: CPT

## 2018-08-26 PROCEDURE — 80307 DRUG TEST PRSMV CHEM ANLYZR: CPT

## 2018-08-26 PROCEDURE — 85027 COMPLETE CBC AUTOMATED: CPT

## 2018-08-26 PROCEDURE — 82962 GLUCOSE BLOOD TEST: CPT

## 2018-08-26 PROCEDURE — 96361 HYDRATE IV INFUSION ADD-ON: CPT

## 2018-08-26 PROCEDURE — 84436 ASSAY OF TOTAL THYROXINE: CPT

## 2018-08-26 PROCEDURE — 99285 EMERGENCY DEPT VISIT HI MDM: CPT | Mod: 25

## 2018-08-26 PROCEDURE — 87086 URINE CULTURE/COLONY COUNT: CPT

## 2018-08-26 PROCEDURE — 82140 ASSAY OF AMMONIA: CPT

## 2018-08-26 PROCEDURE — 85730 THROMBOPLASTIN TIME PARTIAL: CPT

## 2018-08-26 PROCEDURE — 96360 HYDRATION IV INFUSION INIT: CPT

## 2018-08-26 PROCEDURE — 99233 SBSQ HOSP IP/OBS HIGH 50: CPT

## 2018-08-26 PROCEDURE — 83036 HEMOGLOBIN GLYCOSYLATED A1C: CPT

## 2018-08-26 PROCEDURE — 76700 US EXAM ABDOM COMPLETE: CPT

## 2018-08-26 PROCEDURE — 82105 ALPHA-FETOPROTEIN SERUM: CPT

## 2018-08-26 PROCEDURE — 70450 CT HEAD/BRAIN W/O DYE: CPT

## 2018-08-26 PROCEDURE — 85610 PROTHROMBIN TIME: CPT

## 2018-08-26 PROCEDURE — 99239 HOSP IP/OBS DSCHRG MGMT >30: CPT

## 2018-08-26 PROCEDURE — 83605 ASSAY OF LACTIC ACID: CPT

## 2018-08-26 PROCEDURE — 84443 ASSAY THYROID STIM HORMONE: CPT

## 2018-08-26 PROCEDURE — 71045 X-RAY EXAM CHEST 1 VIEW: CPT

## 2018-08-26 PROCEDURE — 82009 KETONE BODYS QUAL: CPT

## 2018-08-26 PROCEDURE — 36415 COLL VENOUS BLD VENIPUNCTURE: CPT

## 2018-08-26 PROCEDURE — 93005 ELECTROCARDIOGRAM TRACING: CPT

## 2018-08-26 RX ORDER — ONDANSETRON 8 MG/1
1 TABLET, FILM COATED ORAL
Qty: 21 | Refills: 0 | OUTPATIENT
Start: 2018-08-26 | End: 2018-09-01

## 2018-08-26 RX ORDER — LACTULOSE 10 G/15ML
10 SOLUTION ORAL
Qty: 1 | Refills: 0 | OUTPATIENT
Start: 2018-08-26 | End: 2018-09-24

## 2018-08-26 RX ORDER — CARVEDILOL PHOSPHATE 80 MG/1
1 CAPSULE, EXTENDED RELEASE ORAL
Qty: 60 | Refills: 0 | OUTPATIENT
Start: 2018-08-26 | End: 2018-09-24

## 2018-08-26 RX ORDER — ONDANSETRON 8 MG/1
4 TABLET, FILM COATED ORAL ONCE
Qty: 0 | Refills: 0 | Status: COMPLETED | OUTPATIENT
Start: 2018-08-26 | End: 2018-08-26

## 2018-08-26 RX ADMIN — Medication 20 UNIT(S): at 07:36

## 2018-08-26 RX ADMIN — ONDANSETRON 4 MILLIGRAM(S): 8 TABLET, FILM COATED ORAL at 07:36

## 2018-08-26 RX ADMIN — PANTOPRAZOLE SODIUM 40 MILLIGRAM(S): 20 TABLET, DELAYED RELEASE ORAL at 07:20

## 2018-08-26 RX ADMIN — LACTULOSE 15 GRAM(S): 10 SOLUTION ORAL at 05:19

## 2018-08-26 RX ADMIN — CARVEDILOL PHOSPHATE 3.12 MILLIGRAM(S): 80 CAPSULE, EXTENDED RELEASE ORAL at 05:20

## 2018-08-26 RX ADMIN — Medication 1: at 07:37

## 2018-08-26 RX ADMIN — Medication 2: at 11:11

## 2018-08-26 RX ADMIN — LACTULOSE 15 GRAM(S): 10 SOLUTION ORAL at 11:12

## 2018-08-26 NOTE — PROGRESS NOTE ADULT - ASSESSMENT
Hepatic encephalopathy improved.  despite the rising ammonia.  Clinically better.  No signs of bleeding.  Hgb actually increased.  Normal BUN.    No surprises on ultrasound exam.  No SOL or ascites.  Hgb rising without PRA.    OK for DC home from GI POV.  DC meds should include [id number] mg po bid and Lactulose 20 gm po bid.  Office follow up with Dr Tafoya in 2-4 weeks.  Pt encouraged to continue the Mavyret for treatment of the HCV infection, 5 more weeks to go on this medication.    Mgmt discussed with Dr Myers.

## 2018-08-26 NOTE — DISCHARGE NOTE ADULT - MEDICATION SUMMARY - MEDICATIONS TO TAKE
I will START or STAY ON the medications listed below when I get home from the hospital:    Levemir 100 units/mL subcutaneous solution  -- 20 unit(s) subcutaneous once a day (in the morning)  -- Indication: For DM    Levemir 100 units/mL subcutaneous solution  -- 10 unit(s) subcutaneous once a day (in the evening)  -- Indication: For DM    ondansetron 4 mg oral tablet  -- 1 tab(s) by mouth 3 times a day, As Needed   -- Indication: For Nausea    Mavyret 100 mg-40 mg oral tablet  -- 3 tab(s) by mouth once a day  -- Indication: For Hepatitis C    carvedilol 3.125 mg oral tablet  -- 1 tab(s) by mouth every 12 hours  -- Indication: For HTN    lactulose 10 g/15 mL oral syrup  -- 10 milliliter(s) by mouth 3 times a day. Please Titrate to 3-4 BM daily  -- Indication: For Hepatic encephalopathy    rifAXIMin 550 mg oral tablet  -- 1 tab(s) by mouth 2 times a day  -- Indication: For Hepatic encephalopathy    omeprazole 20 mg oral delayed release capsule  -- 1 cap(s) by mouth once a day  -- Indication: For GERD I will START or STAY ON the medications listed below when I get home from the hospital:    Levemir 100 units/mL subcutaneous solution  -- 20 unit(s) subcutaneous once a day (in the morning)  -- Indication: For DM    Levemir 100 units/mL subcutaneous solution  -- 10 unit(s) subcutaneous once a day (in the evening)  -- Indication: For DM    ondansetron 4 mg oral tablet  -- 1 tab(s) by mouth 3 times a day, As Needed   -- Indication: For Nausea    Mavyret 100 mg-40 mg oral tablet  -- 3 tab(s) by mouth once a day  -- Indication: For Hepatitis C    carvedilol 3.125 mg oral tablet  -- 1 tab(s) by mouth every 12 hours MDD:hold for SBP < 100 and HR < 60  -- Indication: For HTN    lactulose 10 g/15 mL oral syrup  -- 10 milliliter(s) by mouth 3 times a day. Please Titrate to 3-4 BM daily  -- Indication: For Hepatic encephalopathy    rifAXIMin 550 mg oral tablet  -- 1 tab(s) by mouth 2 times a day  -- Indication: For Hepatic encephalopathy    omeprazole 20 mg oral delayed release capsule  -- 1 cap(s) by mouth once a day  -- Indication: For GERD

## 2018-08-26 NOTE — DISCHARGE NOTE ADULT - CARE PLAN
Principal Discharge DX:	Hepatic encephalopathy  Goal:	resolved  Assessment and plan of treatment:	please take lactulose as instructed and titrate to 3-4 bowel movements daily  please also take Rifaximin as instructed  please follow up with  within 1 week  Secondary Diagnosis:	Chronic hepatitis C without hepatic coma  Assessment and plan of treatment:	abdomen ultrasound with evidence of cirrhosis  continue mavyret daily and follow up with  within 1 week  Secondary Diagnosis:	Diabetes  Assessment and plan of treatment:	continue insulin at home doses  Secondary Diagnosis:	Thrombocytopenia  Assessment and plan of treatment:	due to liver disease  platelet counts stable  follow up with your PCP and Gastroenterologist  Secondary Diagnosis:	Essential hypertension  Assessment and plan of treatment:	please take coreg twice a day as instructed but hold if SBP < 100/60 or HR < 60  follow up with your PCP

## 2018-08-26 NOTE — DISCHARGE NOTE ADULT - PLAN OF CARE
resolved please take lactulose as instructed and titrate to 3-4 bowel movements daily  please also take Rifaximin as instructed  please follow up with  within 1 week abdomen ultrasound with evidence of cirrhosis  continue mavyret daily and follow up with  within 1 week continue insulin at home doses due to liver disease  platelet counts stable  follow up with your PCP and Gastroenterologist please take coreg twice a day as instructed but hold if SBP < 100/60 or HR < 60  follow up with your PCP

## 2018-08-26 NOTE — DISCHARGE NOTE ADULT - HOSPITAL COURSE
Patient is 53 yo female with PMH Hep C on (Mavyret for approx 3/8 weeks), Trigeminal neuralgia, DM2 on insulin who was admitted with hepatic encephalopathy and asterixis.     Problem/Plan - 1:  ·  Problem: Hepatic encephalopathy.  Plan: resolved  -currently AAO x 3  -Continue lactulose and rifaximin   -repeat ammonia level elevated but patient clinically improved  -GI consult appreciated; cleared for discharge with outpatient follow up     Problem/Plan - 2:  ·  Problem: Chronic hepatitis C without hepatic coma.  Plan: continue Mavyret daily    -Abdomen sonogram with evidence of cirrhosis  -GI follow up as outpatient     Problem/Plan - 3:  ·  Problem: Type 2 diabetes mellitus with hyperglycemia, with long-term current use of insulin.  Plan: Continue Levemir 20U morning and 10 units at night   -maintain glycemic control     Problem/Plan - 4:  ·  Problem: Trigeminal neuralgia of right side of face.  Plan: asymptomatic  -supportive care     Problem/Plan - 5:  ·  Problem: Thrombocytopenia.  Plan: likely secondary to liver disease   -no overt signs of bleeding  -outpatient follow up     Problem/Plan - 6:  ·  Problem: Hypokalemia.  Plan: likely due to GI losses  -resolved     Problem/Plan - 7:  ·  Problem: Non-anion gap metabolic acidosis. Plan: likely due to GI losses   -lactate and acetone within normal range  -isotonic bicarb gtt completed  -repeat labs as outpatient    Medically stable for discharge. Time spent on discharge 45 minutes.

## 2018-08-26 NOTE — DISCHARGE NOTE ADULT - ADDITIONAL INSTRUCTIONS
Please follow up with your PCP and  within 1 week to repeat labs and ensure all blood counts are stable.  Please take medications as instructed.

## 2018-08-26 NOTE — DISCHARGE NOTE ADULT - PATIENT PORTAL LINK FT
You can access the [facility] Patient Portal, offered by [facility], by registering with the following website: [email or web address]

## 2018-08-26 NOTE — PROGRESS NOTE ADULT - SUBJECTIVE AND OBJECTIVE BOX
Patient seen and examined;  Received several doses of Lactulose but no diarrhea.  1 small BM today,  no rectal bleeding.  Daughter at bedside who serves as .  No longer foggy or confused.  No agitation.  Tolerated regular diet.    Sonogram:  Coarse echtexture of the liver c/w cirrhosis.  No SM.  No Ascites.  No SOL liver.      PAST MEDICAL & SURGICAL HISTORY:  Chronic hepatitis C without hepatic coma  S/P  section      ROS:  No Heartburn, regurgitation, dysphagia, odynophagia.  No dyspepsia  No abdominal pain.    No Nausea, vomiting.  No Bleeding.  No hematemesis.   No diarrhea.    No hematochesia.  No weight loss, anorexia.  No edema.      MEDICATIONS  (STANDING):  carvedilol 3.125 milliGRAM(s) Oral every 12 hours  dextrose 5%. 1000 milliLiter(s) (50 mL/Hr) IV Continuous <Continuous>  dextrose 50% Injectable 12.5 Gram(s) IV Push once  insulin detemir injectable (LEVEMIR) 20 Unit(s) SubCutaneous before breakfast  insulin detemir injectable (LEVEMIR) 10 Unit(s) SubCutaneous with dinner  insulin lispro (HumaLOG) corrective regimen sliding scale   SubCutaneous three times a day before meals  lactulose Syrup 15 Gram(s) Oral three times a day  Mavyret tablet 3 Tablet(s) 3 Tablet(s) Oral daily  pantoprazole    Tablet 40 milliGRAM(s) Oral before breakfast  rifaximin 550 milliGRAM(s) Oral two times a day    MEDICATIONS  (PRN):  dextrose 40% Gel 15 Gram(s) Oral once PRN Blood Glucose LESS THAN 70 milliGRAM(s)/deciliter  glucagon  Injectable 1 milliGRAM(s) IntraMuscular once PRN Glucose LESS THAN 70 milligrams/deciliter  hydrALAZINE Injectable 10 milliGRAM(s) IV Push every 8 hours PRN SBP > 160      Allergies    No Known Allergies    Intolerances        Vital Signs Last 24 Hrs  T(C): 36.7 (26 Aug 2018 15:35), Max: 36.8 (25 Aug 2018 18:37)  T(F): 98 (26 Aug 2018 15:35), Max: 98.2 (25 Aug 2018 18:37)  HR: 65 (26 Aug 2018 15:35) (65 - 82)  BP: 136/80 (26 Aug 2018 15:35) (126/75 - 174/82)  BP(mean): --  RR: 18 (26 Aug 2018 15:35) (16 - 18)  SpO2: 98% (26 Aug 2018 15:35) (98% - 99%)    PHYSICAL EXAM:    GENERAL: NAD, well-groomed, well-developed  HEAD:  Atraumatic, Normocephalic  EYES: EOMI, PERRLA, conjunctiva and sclera clear  ENMT: No tonsillar erythema, exudates, or enlargement; Moist mucous membranes, Good dentition, No lesions  NECK: Supple, No JVD, Normal thyroid  CHEST/LUNG: Clear to percussion bilaterally; No rales, rhonchi, wheezing, or rubs  HEART: Regular rate and rhythm; No murmurs, rubs, or gallops  ABDOMEN: Soft, Nontender, Nondistended; Bowel sounds present  EXTREMITIES:  2+ Peripheral Pulses, No clubbing, cyanosis, or edema  LYMPH: No lymphadenopathy noted  SKIN: No rashes or lesions.  NEURO:  Mild asterixis      LABS:                        12.9   4.6   )-----------( 126      ( 26 Aug 2018 08:06 )             39.0     08-    138  |  105  |  8.0  ----------------------------<  184<H>  3.6   |  21.0<L>  |  0.54    Ca    8.9      26 Aug 2018 08:06    TPro  7.3  /  Alb  2.8<L>  /  TBili  2.6<H>  /  DBili  x   /  AST  25  /  ALT  14  /  AlkPhos  124<H>      PT/INR - ( 24 Aug 2018 20:23 )   PT: 13.2 sec;   INR: 1.20 ratio         PTT - ( 24 Aug 2018 20:23 )  PTT:35.2 sec   Urinalysis Basic - ( 25 Aug 2018 00:25 )    Color: Yellow / Appearance: Clear / S.015 / pH: x  Gluc: x / Ketone: Negative  / Bili: Negative / Urobili: Negative mg/dL   Blood: x / Protein: Negative mg/dL / Nitrite: Negative   Leuk Esterase: Moderate / RBC: Negative /HPF / WBC 3-5   Sq Epi: x / Non Sq Epi: Few / Bacteria: Few    RADIOLOGY & ADDITIONAL STUDIES:  Sono:  As above.

## 2020-07-30 NOTE — ED ADULT NURSE REASSESSMENT NOTE - GENITOURINARY ASSESSMENT
Problem: Skin Integrity:  Goal: Absence of new skin breakdown  Description: Absence of new skin breakdown  Outcome: Met This Shift   Full skin assessment completed this shift. No new skin breakdown at this time. Pt repositioned q2h and prn. Pt kept clean and dry. Gel overlaymattress in place on bed, heels floated. Will continue to monitor. Problem: Falls - Risk of:  Goal: Will remain free from falls  Description: Will remain free from falls  Outcome: Met This Shift   Pt remains free from falls at this time. Floor free from obstacles, and bed is locked and in lowest position. Adequate lighting provided. Call light within reach; pt encouraged to call before getting OOB for any need. Will continue to monitor needs during hourly rounding.     Electronically signed by Meek Basurto RN on 7/30/2020 at 6:11 AM [facility]